# Patient Record
Sex: FEMALE | Race: WHITE | URBAN - METROPOLITAN AREA
[De-identification: names, ages, dates, MRNs, and addresses within clinical notes are randomized per-mention and may not be internally consistent; named-entity substitution may affect disease eponyms.]

---

## 2018-02-05 PROBLEM — Z00.00 GENERAL MEDICAL EXAMINATION: Status: ACTIVE | Noted: 2018-02-05

## 2018-03-05 PROBLEM — Z00.00 ENCOUNTER FOR GENERAL ADULT MEDICAL EXAMINATION WITHOUT ABNORMAL FINDINGS: Status: ACTIVE | Noted: 2018-02-05

## 2020-03-20 PROBLEM — Z48.89 ENCOUNTER FOR POST SURGICAL WOUND CHECK: Status: ACTIVE | Noted: 2020-03-20

## 2024-10-08 ENCOUNTER — TELEPHONE (OUTPATIENT)
Dept: INTERNAL MEDICINE CLINIC | Facility: CLINIC | Age: 74
End: 2024-10-08

## 2024-10-08 ENCOUNTER — OFFICE VISIT (OUTPATIENT)
Dept: INTERNAL MEDICINE CLINIC | Facility: CLINIC | Age: 74
End: 2024-10-08
Payer: MEDICARE

## 2024-10-08 VITALS
DIASTOLIC BLOOD PRESSURE: 78 MMHG | HEIGHT: 62 IN | BODY MASS INDEX: 28.52 KG/M2 | HEART RATE: 75 BPM | WEIGHT: 155 LBS | SYSTOLIC BLOOD PRESSURE: 130 MMHG

## 2024-10-08 DIAGNOSIS — M85.89 OSTEOPENIA OF MULTIPLE SITES: Primary | ICD-10-CM

## 2024-10-08 DIAGNOSIS — S39.012A STRAIN OF LUMBAR REGION, INITIAL ENCOUNTER: Primary | ICD-10-CM

## 2024-10-08 DIAGNOSIS — Z12.31 ENCOUNTER FOR SCREENING MAMMOGRAM FOR BREAST CANCER: ICD-10-CM
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
## 2024-10-08 PROCEDURE — 99204 OFFICE O/P NEW MOD 45 MIN: CPT | Performed by: INTERNAL MEDICINE

## 2024-10-08 RX ORDER — CYCLOBENZAPRINE HCL 5 MG
5 TABLET ORAL NIGHTLY
Qty: 14 TABLET | Refills: 0 | Status: SHIPPED | OUTPATIENT
Start: 2024-10-08
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
## 2024-10-25 ENCOUNTER — OFFICE VISIT (OUTPATIENT)
Dept: PHYSICAL THERAPY | Age: 74
End: 2024-10-25
Attending: INTERNAL MEDICINE
Payer: MEDICARE

## 2024-10-25 DIAGNOSIS — M54.16 LEFT LUMBAR RADICULOPATHY: Primary | ICD-10-CM

## 2024-10-25 PROCEDURE — 97110 THERAPEUTIC EXERCISES: CPT

## 2024-10-25 PROCEDURE — 97162 PT EVAL MOD COMPLEX 30 MIN: CPT

## 2024-10-25 PROCEDURE — 97530 THERAPEUTIC ACTIVITIES: CPT
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
## 2024-11-04 ENCOUNTER — OFFICE VISIT (OUTPATIENT)
Dept: PHYSICAL THERAPY | Age: 74
End: 2024-11-04
Attending: INTERNAL MEDICINE
Payer: MEDICARE

## 2024-11-04 ENCOUNTER — MED REC SCAN ONLY (OUTPATIENT)
Dept: INTERNAL MEDICINE CLINIC | Facility: CLINIC | Age: 74
End: 2024-11-04

## 2024-11-04 DIAGNOSIS — M54.16 LEFT LUMBAR RADICULOPATHY: Primary | ICD-10-CM

## 2024-11-04 PROCEDURE — 97110 THERAPEUTIC EXERCISES: CPT

## 2024-11-04 PROCEDURE — 97112 NEUROMUSCULAR REEDUCATION: CPT

## 2024-11-04 NOTE — PROGRESS NOTES
Diagnosis:   Left lumbar radiculopathy       Referring Provider: Travis Javed  Date of Evaluation:    10/25/24    Precautions:  None Next MD visit:   none scheduled  Date of Surgery: n/a   Insurance Primary/Secondary: MEDICARE / [place]BS [place] INDEMN[place]     # Auth Visits: 10            Subjective: Patient reports no lbp this afternoon. She does feel left le weakness though. She has been doing her HEP and felt ok after her initial eval.    Pain: 0/10      Objective:   Observation/Posture: Forward bent and right trunk lean posture  Neuro Screen: Intact lulu.     Trunk AROM: (* denotes performed with pain)  Flexion: Able to touch her toes  Extension: 25%  Sidebending: R 50%; L 50%  Rotation: R 50%; L 50%     Accessory motion: Lumbar hypomobility in all directions; +2 grades.  Palpation: Left glut. Region.     Strength: (* denotes performed with pain)  LE   Hip flexion (L2): R 4+/5; L 3+/5  Hip abduction: R 4+/5; L 3+/5  Hip Extension: R 4+/5; L 3+/5            Hip ER: R 4+/5; L 3+/5  Hip IR: R 4+/5; L 3+/5  Knee Flexion: R 4+/5; L 3+/5            Knee extension (L3): R 4+/5; L 3+/5            DF (L4): R 4+/5; L 3+/5  Great Toe Ext (L5): R 4+/5, L 3+/5  PF (S1): R 4+/5; L 3+/5      Flexibility:   LE   Hip Flexor: R WFL, L Tight  Hamstrings: R WFL; L Tight  Piriformis: R Tight; L Tighter  Quads: R WFL; L Tight  Gastroc-soleus: R WFL; L Tight      Special tests:   Negative for slump and slr tests lulu.     Gait: pt ambulates on level ground with antalgia, decreased step length right le, decreased stance phase left le, and right trunk lean .      Assessment: Patient presents with left le tightness during her manual stretching exercises. The patient tolerate her table and sitting exercises and did not have any complaints of lbp during her treatment.      Goals: (to be met in 10 visits)   .Pt will improve transversus abdominis recruitment to perform proper isometric contraction without requiring verbal or tactile cuing to  promote advancement of therex   Pt will demonstrate good understanding of proper posture and body mechanics to decrease pain and improve spinal safety   Pt will report improved symptom centralization and absence of radicular symptoms for 3 consecutive days to improve function with ADL   Pt will have decreased paraspinal mm tension to tolerate standing >10 minutes for work and home activities   Pt will demonstrate improved core strength to be able to perform squatting with <1/10 pain   Pt will be independent and compliant with comprehensive HEP to maintain progress achieved in PT     Plan: Plan to work on stretching, strengthening and trom.  Date: 11/4/2024  TX#: 2/10 Date:                 TX#: 3/ Date:                 TX#: 4/ Date:                 TX#: 5/ Date:   Tx#: 6/   Ther. Ex.: 25'  Nu-step 8' L1  Left HS St. With pump 2x30\"  Left PFS and Glut. St. 2x30\"xea.  Trunk Rolls 20xea.  PPT 20x3\"       Neuro Re-ed.- 20'  Supine Marching 20xea.  S/L Clams 30xea.  LAQs 20xea.  Neural Flossing 30x  1/4 Squats 20x       HEP: Reviewed her original HEP.    Charges: 2TE and 1Neuro       Total Timed Treatment: 45 min  Total Treatment Time: 45 min

## 2024-11-06 ENCOUNTER — OFFICE VISIT (OUTPATIENT)
Dept: PHYSICAL THERAPY | Age: 74
End: 2024-11-06
Attending: INTERNAL MEDICINE
Payer: MEDICARE

## 2024-11-06 PROCEDURE — 97112 NEUROMUSCULAR REEDUCATION: CPT

## 2024-11-06 PROCEDURE — 97110 THERAPEUTIC EXERCISES: CPT

## 2024-11-06 NOTE — PROGRESS NOTES
Diagnosis:   Left lumbar radiculopathy       Referring Provider: Travis Javed  Date of Evaluation:    10/25/24    Precautions:  None Next MD visit:   none scheduled  Date of Surgery: n/a   Insurance Primary/Secondary: MEDICARE / [place]DEMN[place]     # Auth Visits: 10            Subjective: Patient reports feeling a lot better. Denies any LBP, but still has  occasional pain down her leg . PT states no strength in (L) LE , has difficulty on stairs and getting up from floor. HEP compliance reported.    Pain: 0/10      Objective:   Observation/Posture: Forward bent and right trunk lean posture  Neuro Screen: Intact lulu.     Trunk AROM: (* denotes performed with pain)  Flexion: Able to touch her toes  Extension: 25%  Sidebending: R 50%; L 50%  Rotation: R 50%; L 50%     Accessory motion: Lumbar hypomobility in all directions; +2 grades.  Palpation: Left glut. Region.     Strength: (* denotes performed with pain)  LE   Hip flexion (L2): R 4+/5; L 3+/5  Hip abduction: R 4+/5; L 3+/5  Hip Extension: R 4+/5; L 3+/5            Hip ER: R 4+/5; L 3+/5  Hip IR: R 4+/5; L 3+/5  Knee Flexion: R 4+/5; L 3+/5            Knee extension (L3): R 4+/5; L 3+/5            DF (L4): R 4+/5; L 3+/5  Great Toe Ext (L5): R 4+/5, L 3+/5  PF (S1): R 4+/5; L 3+/5      Flexibility:   LE   Hip Flexor: R WFL, L Tight  Hamstrings: R WFL; L Tight  Piriformis: R Tight; L Tighter  Quads: R WFL; L Tight  Gastroc-soleus: R WFL; L Tight      Special tests:   Negative for slump and slr tests lulu.     Gait: pt ambulates on level ground with antalgia, decreased step length right le, decreased stance phase left le, and right trunk lean .      Assessment: Patient presents with l min - mod tenderness on (B) SI joint; displays discomfort and pain in LB with bed mobility .  Pt instructed on core activation to prevent worsening of symptoms. Cuing provided with squats  for hip hinging and prevet anterior translation of femur. Encouraged pt to take frequent  strech breaks and to change positions every 20-30 minutes to prevent stiffness and pain. An updated copy of HEP provided to the patient. All exercises performed to fatigue with o exacerbation of symptoms. Planning to progress with core and LE strengthening exercises to resume PLOF.      Goals: (to be met in 10 visits)   .Pt will improve transversus abdominis recruitment to perform proper isometric contraction without requiring verbal or tactile cuing to promote advancement of therex   Pt will demonstrate good understanding of proper posture and body mechanics to decrease pain and improve spinal safety   Pt will report improved symptom centralization and absence of radicular symptoms for 3 consecutive days to improve function with ADL   Pt will have decreased paraspinal mm tension to tolerate standing >10 minutes for work and home activities   Pt will demonstrate improved core strength to be able to perform squatting with <1/10 pain   Pt will be independent and compliant with comprehensive HEP to maintain progress achieved in PT     Plan: Plan to work on stretching, strengthening and trom.  Date: 11/4/2024  TX#: 2/10 Date:11/06/2024              TX#: 3/10 Date:                 TX#: 4/ Date:                 TX#: 5/ Date:   Tx#: 6/   Ther. Ex.: 25'  Nu-step 8' L1  Left HS St. With pump 2x30\"  Left PFS and Glut. St. 2x30\"xea.  Trunk Rolls 20xea.  PPT 20x3\" Ther. Ex.: 25'  -Nu-step 8' L1  -Left HS St. With pump 2x30\"  -Left PFS and Glut. St. 2x30\"xea.  -Trunk Rolls 5 x 1; 10 sec hold   -PPT 20x3\"  - Supine Bridges: 10 x 2  - Supine pelvic tilts: 20 x 1      Neuro Re-ed.- 20'  Supine Marching 20xea.  S/L Clams 30xea.  LAQs 20xea.  Neural Flossing 30x  1/4 Squats 20x Neuro Re-ed.- 20'  -Supine Marching 20 x ea /YTB  -  S/L Clams 10 x 2 ea/ YTB  - Supine Clams: 20 x 1 YTB  -LAQs 20xea.  -Neural Flossing 30x  -1/4 Squats 20x      HEP: Reviewed her original HEP.    Charges: 2TE and 1Neuro       Total Timed Treatment: 45  min  Total Treatment Time: 45 min

## 2024-11-11 ENCOUNTER — OFFICE VISIT (OUTPATIENT)
Dept: PHYSICAL THERAPY | Age: 74
End: 2024-11-11
Attending: INTERNAL MEDICINE
Payer: MEDICARE

## 2024-11-11 PROCEDURE — 97112 NEUROMUSCULAR REEDUCATION: CPT

## 2024-11-11 PROCEDURE — 97110 THERAPEUTIC EXERCISES: CPT

## 2024-11-11 NOTE — PROGRESS NOTES
Diagnosis:   Left lumbar radiculopathy       Referring Provider: Travis Javed  Date of Evaluation:    10/25/24    Precautions:  None Next MD visit:   none scheduled  Date of Surgery: n/a   Insurance Primary/Secondary: MEDICARE / [place]DEMN[place]     # Auth Visits: 10            Subjective: Patient reports being sore yesterday after receiving the covid vaccine. Denies any LBP, but still has minimal tenderness in (L) PSIS area. HEP compliance reported.    Pain: 0/10      Objective:   Observation/Posture: Forward bent and right trunk lean posture  Neuro Screen: Intact lulu.     Trunk AROM: (* denotes performed with pain)  Flexion: Able to touch her toes  Extension: 25%  Sidebending: R 50%; L 50%  Rotation: R 50%; L 50%    Assessment: Patient presents with min - mod tenderness on (L) SI joint; displays discomfort and pain in LB with bed mobility. Progressed hip muscle strengthening with resistance. Initiated 3 way gait with resistance to strengthen hip muscles and  dynamic balance. Displays trunk leaning to (L). Pt able to correct posture with visual and verbal cuing   Pt instructed on core activation to prevent worsening of symptoms. All exercises performed to fatigue with out exacerbation of symptoms. Planning to progress with core and LE strengthening exercises to resume PLOF.      Goals: (to be met in 10 visits)   .Pt will improve transversus abdominis recruitment to perform proper isometric contraction without requiring verbal or tactile cuing to promote advancement of therex   Pt will demonstrate good understanding of proper posture and body mechanics to decrease pain and improve spinal safety   Pt will report improved symptom centralization and absence of radicular symptoms for 3 consecutive days to improve function with ADL   Pt will have decreased paraspinal mm tension to tolerate standing >10 minutes for work and home activities   Pt will demonstrate improved core strength to be able to perform  squatting with <1/10 pain   Pt will be independent and compliant with comprehensive HEP to maintain progress achieved in PT     Plan: Plan to work on stretching, strengthening and trom.  Date: 11/4/2024  TX#: 2/10 Date:11/06/2024              TX#: 3/10 Date:11/11/2024                TX#: 4/0 Date:                 TX#: 5/ Date:   Tx#: 6/   Ther. Ex.: 25'  Nu-step 8' L1  Left HS St. With pump 2x30\"  Left PFS and Glut. St. 2x30\"xea.  Trunk Rolls 20xea.  PPT 20x3\" Ther. Ex.: 25'  -Nu-step 8' L1  -Left HS St. With pump 2x30\"  -Left PFS and Glut. St. 2x30\"xea.  -Trunk Rolls 5 x 1; 10 sec hold   -PPT 20x3\"  - Supine Bridges: 10 x 2  - Supine pelvic tilts: 20 x 1 Ther. Ex.: 25'  -Nu-step 8' L1  -Trunk Rolls 5 x 1; 10 sec hold   -PPT 20x3\"  - Single LE Bridges: 10 x 2  - 3 way stepping : RTB  - figure 4 stretch  - SKTC: 10 x 1   -Seated hip adductor squeeze: 20 x 1; 5 sec  - standing calf muscle stretch: 5x 1; 10 sec    - 3 way ball roll outs; 5x 5 sec     Neuro Re-ed.- 20'  Supine Marching 20xea.  S/L Clams 30xea.  LAQs 20xea.  Neural Flossing 30x  1/4 Squats 20x Neuro Re-ed.- 20'  -Supine Marching 20 x ea /YTB  -  S/L Clams 10 x 2 ea/ YTB  - Supine Clams: 20 x 1 YTB  -LAQs 20xea.  -Neural Flossing 30x  -1/4 Squats 20x Neuro Re-ed.- 18'  -Supine Marching 20 x ea /GTB  -  S/L Clams 20 x 1  GTB  - Supine Clams: 20 x 1 GTB  -1/4 Squats 10 x 2 ; with eccentric control     HEP: Reviewed her original HEP.    Charges: 2TE and 1Neuro       Total Timed Treatment: 45 min  Total Treatment Time: 46 min

## 2024-11-13 ENCOUNTER — OFFICE VISIT (OUTPATIENT)
Dept: PHYSICAL THERAPY | Age: 74
End: 2024-11-13
Attending: INTERNAL MEDICINE
Payer: MEDICARE

## 2024-11-13 PROCEDURE — 97112 NEUROMUSCULAR REEDUCATION: CPT

## 2024-11-13 PROCEDURE — 97110 THERAPEUTIC EXERCISES: CPT

## 2024-11-13 NOTE — PROGRESS NOTES
Diagnosis:   Left lumbar radiculopathy       Referring Provider: Travis Javed  Date of Evaluation:    10/25/24    Precautions:  None Next MD visit:   none scheduled  Date of Surgery: n/a   Insurance Primary/Secondary: MEDICARE / [place] INDEMN[place]     # Auth Visits: 10            Subjective: Patient reports being sore yesterday after receiving the covid vaccine. Denies any LBP, but still has minimal tenderness in (L) PSIS area. HEP compliance reported.    Pain: 0/10      Objective:   Observation/Posture: Forward bent and right trunk lean posture  Neuro Screen: Intact lulu.     Trunk AROM: (* denotes performed with pain)  Flexion: Able to touch her toes  Extension: 25%  Sidebending: R 50%; L 50%  Rotation: R 50%; L 50%    Assessment: Patient presents with progress in her pain , functional mobility and strength .Progressed  supine bridges to SB  to activate core and stabilize spine. Difficulty maintaining balance with bird dog exercises. All exercises performed to fatigue with out exacerbation of symptoms. Planning to progress with core and LE strengthening exercises to resume PLOF.      Goals: (to be met in 10 visits)   .Pt will improve transversus abdominis recruitment to perform proper isometric contraction without requiring verbal or tactile cuing to promote advancement of therex   Pt will demonstrate good understanding of proper posture and body mechanics to decrease pain and improve spinal safety   Pt will report improved symptom centralization and absence of radicular symptoms for 3 consecutive days to improve function with ADL   Pt will have decreased paraspinal mm tension to tolerate standing >10 minutes for work and home activities   Pt will demonstrate improved core strength to be able to perform squatting with <1/10 pain   Pt will be independent and compliant with comprehensive HEP to maintain progress achieved in PT     Plan: Plan to work on stretching, strengthening and trom.  Date: 11/4/2024  TX#:  2/10 Date:11/06/2024              TX#: 3/10 Date:11/11/2024                TX#: 4/0 Date:11/13/2024                TX#: 5/10 Date:   Tx#: 6/   Ther. Ex.: 25'  Nu-step 8' L1  Left HS St. With pump 2x30\"  Left PFS and Glut. St. 2x30\"xea.  Trunk Rolls 20xea.  PPT 20x3\" Ther. Ex.: 25'  -Nu-step 8' L1  -Left HS St. With pump 2x30\"  -Left PFS and Glut. St. 2x30\"xea.  -Trunk Rolls 5 x 1; 10 sec hold   -PPT 20x3\"  - Supine Bridges: 10 x 2  - Supine pelvic tilts: 20 x 1 Ther. Ex.: 25'  -Nu-step 8' L1  -Trunk Rolls 5 x 1; 10 sec hold   -PPT 20x3\"  - Single LE Bridges: 10 x 2  - 3 way stepping : RTB  - figure 4 stretch  - SKTC: 10 x 1   -Seated hip adductor squeeze: 20 x 1; 5 sec  - standing calf muscle stretch: 5x 1; 10 sec    - 3 way ball roll outs; 5x 5 sec Ther. Ex.: 28  -Trunk Rolls 5 x 1; 10 sec hold   -dead bug : 3 sets x 8 reps  DKTC ; 3 x 1; 10 sec   - bird/dog: 3 sets x 8 reps   - chid pose stretch: 3 x 1; 10 s   - DL bridges on SB: 10 x 2; 3 sec   - 3 way stepping :  20 steps x 2  each RTB  - figure 4 stretch: 3 x  15 sec   - standing calf muscle stretch: 3x 1; 15 sec    - 3 way ball roll outs; 3 x 15  sec    Neuro Re-ed.- 20'  Supine Marching 20xea.  S/L Clams 30xea.  LAQs 20xea.  Neural Flossing 30x  1/4 Squats 20x Neuro Re-ed.- 20'  -Supine Marching 20 x ea /YTB  -  S/L Clams 10 x 2 ea/ YTB  - Supine Clams: 20 x 1 YTB  -LAQs 20xea.  -Neural Flossing 30x  -1/4 Squats 20x Neuro Re-ed.- 18'  -Supine Marching 20 x ea /GTB  -  S/L Clams 20 x 1  GTB  - Supine Clams: 20 x 1 GTB  -1/4 Squats 10 x 2 ; with eccentric control Neuro Re-ed.- 18'  -Supine Marching 20 x ea /GTB  -  S/L hip abd with knee bend : 20 x 1 GTB  - SLE standing  3 way hip : 10 x 2  -1/4 Squats 10 x 2 ; with eccentric control    HEP: Reviewed her original HEP.    Charges: 2TE and 1Neuro       Total Timed Treatment: 46 min  Total Treatment Time: 46 min

## 2024-11-18 ENCOUNTER — OFFICE VISIT (OUTPATIENT)
Dept: PHYSICAL THERAPY | Age: 74
End: 2024-11-18
Attending: INTERNAL MEDICINE
Payer: MEDICARE

## 2024-11-18 DIAGNOSIS — M54.16 LEFT LUMBAR RADICULOPATHY: Primary | ICD-10-CM

## 2024-11-18 PROCEDURE — 97110 THERAPEUTIC EXERCISES: CPT

## 2024-11-18 PROCEDURE — 97112 NEUROMUSCULAR REEDUCATION: CPT

## 2024-11-18 NOTE — PROGRESS NOTES
Diagnosis:   Left lumbar radiculopathy       Referring Provider: Travis Javed  Date of Evaluation:    10/25/24    Precautions:  None Next MD visit:   none scheduled  Date of Surgery: n/a   Insurance Primary/Secondary: MEDIC[place] / [place]DEMN[place]     # Auth Visits: 10            Subjective: Patient reports having no pain this am. Patient has been doing her HEP and felt ok after her last visit.    Pain: 0/10      Objective:   Observation/Posture: Forward bent and right trunk lean posture  Neuro Screen: Intact lulu.     Trunk AROM: (* denotes performed with pain)  Flexion: Able to touch her toes  Extension: 25%  Sidebending: R 50%; L 50%  Rotation: R 50%; L 50%    Assessment: Patient presents with no pain during her treatment session. Patient still presents with lulu. Le tightness during her manual stretches. Planning to progress with core and LE strengthening exercises to resume PLOF.      Goals: (to be met in 10 visits)   .Pt will improve transversus abdominis recruitment to perform proper isometric contraction without requiring verbal or tactile cuing to promote advancement of therex   Pt will demonstrate good understanding of proper posture and body mechanics to decrease pain and improve spinal safety   Pt will report improved symptom centralization and absence of radicular symptoms for 3 consecutive days to improve function with ADL   Pt will have decreased paraspinal mm tension to tolerate standing >10 minutes for work and home activities   Pt will demonstrate improved core strength to be able to perform squatting with <1/10 pain   Pt will be independent and compliant with comprehensive HEP to maintain progress achieved in PT     Plan: Plan to work on stretching, strengthening and trom.  Date: 11/4/2024  TX#: 2/10 Date:11/06/2024              TX#: 3/10 Date:11/11/2024                TX#: 4/0 Date:11/13/2024                TX#: 5/10 Date: 11/18/24  Tx#: 6/10   Ther. Ex.: 25'  Nu-step 8' L1  Left HS St. With  pump 2x30\"  Left PFS and Glut. St. 2x30\"xea.  Trunk Rolls 20xea.  PPT 20x3\" Ther. Ex.: 25'  -Nu-step 8' L1  -Left HS St. With pump 2x30\"  -Left PFS and Glut. St. 2x30\"xea.  -Trunk Rolls 5 x 1; 10 sec hold   -PPT 20x3\"  - Supine Bridges: 10 x 2  - Supine pelvic tilts: 20 x 1 Ther. Ex.: 25'  -Nu-step 8' L1  -Trunk Rolls 5 x 1; 10 sec hold   -PPT 20x3\"  - Single LE Bridges: 10 x 2  - 3 way stepping : RTB  - figure 4 stretch  - SKTC: 10 x 1   -Seated hip adductor squeeze: 20 x 1; 5 sec  - standing calf muscle stretch: 5x 1; 10 sec    - 3 way ball roll outs; 5x 5 sec Ther. Ex.: 28  -Trunk Rolls 5 x 1; 10 sec hold   -dead bug : 3 sets x 8 reps  DKTC ; 3 x 1; 10 sec   - bird/dog: 3 sets x 8 reps   - chid pose stretch: 3 x 1; 10 s   - DL bridges on SB: 10 x 2; 3 sec   - 3 way stepping :  20 steps x 2  each RTB  - figure 4 stretch: 3 x  15 sec   - standing calf muscle stretch: 3x 1; 15 sec    - 3 way ball roll outs; 3 x 15  sec Ther. Ex.: 30'  -Trunk Rolls 5 x 1; 10 sec hold   SKTC ; 3 x 1; 10 sec   - DL bridges: 10 x 2; 3 sec   - Side stepping :  5 Laps  - figure 4 stretch: 3 x  15 sec   - standing calf muscle stretch: 3x 1; 15 sec    - 3 way ball roll outs; 3 x 15  sec  Nu-Step 8' L5   Neuro Re-ed.- 20'  Supine Marching 20xea.  S/L Clams 30xea.  LAQs 20xea.  Neural Flossing 30x  1/4 Squats 20x Neuro Re-ed.- 20'  -Supine Marching 20 x ea /YTB  -  S/L Clams 10 x 2 ea/ YTB  - Supine Clams: 20 x 1 YTB  -LAQs 20xea.  -Neural Flossing 30x  -1/4 Squats 20x Neuro Re-ed.- 18'  -Supine Marching 20 x ea /GTB  -  S/L Clams 20 x 1  GTB  - Supine Clams: 20 x 1 GTB  -1/4 Squats 10 x 2 ; with eccentric control Neuro Re-ed.- 18'  -Supine Marching 20 x ea /GTB  -  S/L hip abd with knee bend : 20 x 1 GTB  - SLE standing  3 way hip : 10 x 2  -1/4 Squats 10 x 2 ; with eccentric control Neuro Re-ed.- 15'  -Supine Marching 20 x ea.  - SLE standing  2 way hip : 10 x 2ea.  -1/4 Squats 10 x 2 ; with eccentric control   HEP: Did not add any new  exercises to her HEP.    Charges: 2TE and 1Neuro       Total Timed Treatment: 45 min  Total Treatment Time: 45 min

## 2024-11-21 ENCOUNTER — APPOINTMENT (OUTPATIENT)
Dept: PHYSICAL THERAPY | Age: 74
End: 2024-11-21
Attending: INTERNAL MEDICINE
Payer: MEDICARE

## 2024-11-22 ENCOUNTER — OFFICE VISIT (OUTPATIENT)
Dept: PHYSICAL THERAPY | Age: 74
End: 2024-11-22
Attending: INTERNAL MEDICINE
Payer: MEDICARE

## 2024-11-22 PROCEDURE — 97110 THERAPEUTIC EXERCISES: CPT

## 2024-11-22 PROCEDURE — 97112 NEUROMUSCULAR REEDUCATION: CPT

## 2024-11-22 NOTE — PROGRESS NOTES
Diagnosis:   Left lumbar radiculopathy       Referring Provider: Travis Javed  Date of Evaluation:    10/25/24    Precautions:  None Next MD visit:   none scheduled  Date of Surgery: n/a   Insurance Primary/Secondary: MEDICARE / [place] INDEMNITY     # Auth Visits: 10          Discharge Summary  Pt has attended 6 visits in Physical Therapy.        Subjective: Patient  denies any pain today and states feeling a lot better. reports  50% overall improvement , states that she is feeling a lot better and want to get discharged from therapy. She cancelled her future PT appointments. HEP compliance reported.    Pain: 0/10      Objective:   Observation/Posture: Forward bent and right trunk lean posture  Neuro Screen: Intact lulu.     Trunk AROM: (* denotes performed with pain)  Flexion: Able to touch her toes  Extension: WFL  Sidebending: WFL  Rotation: WFL    Assessment: Patient  displayss significantprogress in her functional strength ,mobility and pain .She has met all her established goals. Pt is being discherged from skilled therapy today. Reviwed HEP and provided pt an updated copy of HEP for continued wellness. Pt verbalized understanding .      Goals: (to be met in 10 visits)   .Pt will improve transversus abdominis recruitment to perform proper isometric contraction without requiring verbal or tactile cuing to promote advancement of therex - Met  Pt will demonstrate good understanding of proper posture and body mechanics to decrease pain and improve spinal safety - Met  Pt will report improved symptom centralization and absence of radicular symptoms for 3 consecutive days to improve function with ADL - Met  Pt will have decreased paraspinal mm tension to tolerate standing >10 minutes for work and home activities - Met  Pt will demonstrate improved core strength to be able to perform squatting with <1/10 pain - Met  Pt will be independent and compliant with comprehensive HEP to maintain progress achieved in PT  - Met    Plan:Discharge from skilled PT . Reviewed HEP with the patient for continued wellness  Post Oswestry Disability Index Score  No data recorded  12 % improvement    Plan: Discharge skilled therapy     Patient/Family/Caregiver was advised of these findings, precautions, and treatment options and has agreed to actively participate in planning and for this course of care.    Thank you for your referral. If you have any questions, please contact me at Dept: [phone].    Sincerely,  Electronically signed by therapist: Abigail Laguerre PT     Physician's certification required:  Yes  Please co-sign or sign and return this letter via fax as soon as possible to [phone].   I certify the need for these services furnished under this plan of treatment and while under my care.    X___________________________________________________ Date____________________                    Certification From: 11/22/2024  To:2/20/2025   Date: 11/4/2024  TX#: 2/10 Date:11/06/2024              TX#: 3/10 Date:11/11/2024                TX#: 4/0 Date:11/13/2024                TX#: 5/10 Date:11/22/2024   Tx#: 6/10   Ther. Ex.: 25'  Nu-step 8' L1  Left HS St. With pump 2x30\"  Left PFS and Glut. St. 2x30\"xea.  Trunk Rolls 20xea.  PPT 20x3\" Ther. Ex.: 25'  -Nu-step 8' L1  -Left HS St. With pump 2x30\"  -Left PFS and Glut. St. 2x30\"xea.  -Trunk Rolls 5 x 1; 10 sec hold   -PPT 20x3\"  - Supine Bridges: 10 x 2  - Supine pelvic tilts: 20 x 1 Ther. Ex.: 25'  -Nu-step 8' L1  -Trunk Rolls 5 x 1; 10 sec hold   -PPT 20x3\"  - Single LE Bridges: 10 x 2  - 3 way stepping : RTB  - figure 4 stretch  - SKTC: 10 x 1   -Seated hip adductor squeeze: 20 x 1; 5 sec  - standing calf muscle stretch: 5x 1; 10 sec    - 3 way ball roll outs; 5x 5 sec Ther. Ex.: 28  -Trunk Rolls 5 x 1; 10 sec hold   -dead bug : 3 sets x 8 reps  DKTC ; 3 x 1; 10 sec   - bird/dog: 3 sets x 8 reps   - chid pose stretch: 3 x 1; 10 s   - DL bridges on SB: 10 x 2; 3 sec   - 3 way stepping  :  20 steps x 2  each RTB  - figure 4 stretch: 3 x  15 sec   - standing calf muscle stretch: 3x 1; 15 sec    - 3 way ball roll outs; 3 x 15  sec Ther. Ex.: 28  - Nustep: 8 minutes  - Mini squats: 10 x 2  -Trunk Rolls 5 x 1; 10 sec hold   -dead bug : 3 sets x 8 reps  DKTC ; 3 x 1; 10 sec   - bird/dog: 3 sets x 8 reps   - chid pose stretch: 3 x 1; 10 s   - DL bridges/ball roll ins  on SB: 8 x 2 each   - 3 way stepping :  20 steps x 2  each RTB  - figure 4 stretch: 3 x  15 sec   - standing calf muscle stretch: 3x 1; 15 sec    - 3 way ball roll outs; 3 x 15  sec   Neuro Re-ed.- 20'  Supine Marching 20xea.  S/L Clams 30xea.  LAQs 20xea.  Neural Flossing 30x  1/4 Squats 20x Neuro Re-ed.- 20'  -Supine Marching 20 x ea /YTB  -  S/L Clams 10 x 2 ea/ YTB  - Supine Clams: 20 x 1 YTB  -LAQs 20xea.  -Neural Flossing 30x  -1/4 Squats 20x Neuro Re-ed.- 18'  -Supine Marching 20 x ea /GTB  -  S/L Clams 20 x 1  GTB  - Supine Clams: 20 x 1 GTB  -1/4 Squats 10 x 2 ; with eccentric control Neuro Re-ed.- 18'  -Supine Marching 20 x ea /GTB  -  S/L hip abd with knee bend : 20 x 1 GTB  - SLE standing  3 way hip : 10 x 2  -1/4 Squats 10 x 2 ; with eccentric control Neuro Re-ed.- 18'  -Supine Marching 20 x ea /GTB  -  S/L hip abd with knee bend : 20 x 1 GTB  - SLE standing  3 way hip : 10 x 2  -1/4 Squats 10 x 2 ; with eccentric contr   HEP: Reviewed her original HEP.    Charges: 2TE and 1Neuro       Total Timed Treatment: 46 min  Total Treatment Time: 46 min

## 2024-11-25 ENCOUNTER — APPOINTMENT (OUTPATIENT)
Dept: PHYSICAL THERAPY | Age: 74
End: 2024-11-25
Attending: INTERNAL MEDICINE
Payer: MEDICARE

## 2025-01-28 ENCOUNTER — TELEPHONE (OUTPATIENT)
Dept: INTERNAL MEDICINE CLINIC | Facility: CLINIC | Age: 75
End: 2025-01-28

## 2025-01-28 DIAGNOSIS — Z12.31 VISIT FOR SCREENING MAMMOGRAM: Primary | ICD-10-CM

## 2025-01-28 NOTE — TELEPHONE ENCOUNTER
Patient called to request Order for Mammogram so she can go to [facility] instead of internal.     [facility]

## 2025-01-30 NOTE — TELEPHONE ENCOUNTER
I have ordered the mammo, but she is known to me from my previous job. She is following with Dr. Lawson now it seems? If she wants to follow with me, she should come in to establish care

## 2025-01-30 NOTE — TELEPHONE ENCOUNTER
Spoke to Pt, relayed Dr message  , stated she will be following up with Dr Javed, she wasn't sure why encounter was sent to you

## 2025-03-04 ENCOUNTER — OFFICE VISIT (OUTPATIENT)
Dept: INTERNAL MEDICINE CLINIC | Facility: CLINIC | Age: 75
End: 2025-03-04

## 2025-03-04 VITALS
DIASTOLIC BLOOD PRESSURE: 70 MMHG | WEIGHT: 166.19 LBS | SYSTOLIC BLOOD PRESSURE: 128 MMHG | HEIGHT: 62 IN | BODY MASS INDEX: 30.58 KG/M2 | HEART RATE: 79 BPM

## 2025-03-04 DIAGNOSIS — E78.2 MIXED HYPERLIPIDEMIA: ICD-10-CM

## 2025-03-04 DIAGNOSIS — Z12.11 COLON CANCER SCREENING: ICD-10-CM

## 2025-03-04 DIAGNOSIS — Z87.19 HISTORY OF DIVERTICULITIS OF COLON: ICD-10-CM

## 2025-03-04 DIAGNOSIS — E55.9 VITAMIN D DEFICIENCY: ICD-10-CM

## 2025-03-04 DIAGNOSIS — M85.89 OSTEOPENIA OF MULTIPLE SITES: ICD-10-CM

## 2025-03-04 DIAGNOSIS — Z00.00 MEDICARE ANNUAL WELLNESS VISIT, SUBSEQUENT: Primary | ICD-10-CM

## 2025-03-04 DIAGNOSIS — Z12.31 ENCOUNTER FOR SCREENING MAMMOGRAM FOR BREAST CANCER: ICD-10-CM

## 2025-03-04 PROCEDURE — G0439 PPPS, SUBSEQ VISIT: HCPCS | Performed by: INTERNAL MEDICINE

## 2025-03-04 NOTE — PROGRESS NOTES
Subjective:   Stacia Falcon is a 75 year old female who presents for a Medicare Subsequent Annual Wellness visit (Pt already had Initial Annual Wellness) and scheduled follow up of multiple significant but stable problems.       History/Other:   Fall Risk Assessment:   She has been screened for Falls and is low risk.      Cognitive Assessment:   She had a completely normal cognitive assessment - see flowsheet entries     Functional Ability/Status:   Stacia Falcon has some abnormal functions as listed below:  She has Vision problems based on screening of functional status.       Depression Screening (PHQ):  PHQ-2 SCORE: 0  , done 3/4/2025            Advanced Directives:   She does have a Living Will but we do NOT have it on file in Epic.    She does have a POA but we do NOT have it on file in [email or web address].    Patient has Advance Care Planning documents present in EMR. Reviewed documents with patient (and family/surrogate if present).      Patient Active Problem List   Diagnosis    Hypercholesterolemia    Spinal stenosis in cervical region    Microscopic hematuria    Vitamin D deficiency    Encounter for screening mammogram for malignant neoplasm of breast    Encounter for screening for osteoporosis    Encounter for general adult medical examination without abnormal findings    Disorder of bone, unspecified    Encounter for screening for malignant neoplasm of cervix    Colon cancer screening    Osteopenia of multiple sites    Constipation, slow transit    Menopausal and postmenopausal disorder    Diverticulosis of large intestine without hemorrhage    Mixed hyperlipidemia     Allergies:  She is allergic to codeine.    Current Medications:  Outpatient Medications Marked as Taking for the 3/4/25 encounter (Office Visit) with Travis Javed MD   Medication Sig    Lovastatin 20 MG Oral Tab Take 1 tablet (20 mg total) by mouth nightly.    Cholecalciferol (VITAMIN D-3) 5000 units Oral Tab Take 2,500 tablets (12,500,000  Units total) by mouth daily.       Medical History:  She  has a past medical history of Constipation, slow transit, Disorder of bone, unspecified, Diverticulitis of large intestine with perforation and abscess with bleeding, Diverticulosis of large intestine without hemorrhage, Encounter for general adult medical examination without abnormal findings, Encounter for screening for malignant neoplasm of cervix, Encounter for screening for malignant neoplasm of intestinal tract, unspecified, Encounter for screening for osteoporosis, Encounter for screening mammogram for malignant neoplasm of breast, GBS (Guillain Tyrone syndrome) (HCC), Hypercholesterolemia, Hyperlipidemia, Microscopic hematuria, Osteopenia, unspecified location, Spinal stenosis in cervical region, and Vitamin D deficiency, unspecified.  Surgical History:  She  has a past surgical history that includes tonsillectomy; other surgical history (Right); other surgical history (2020); and other surgical history (2020).   Family History:  Her family history includes Heart Disease in her brother, father, and mother; Heart Disorder in her brother, father, and mother; No Known Problems in her daughter and son; peripheral vascular disease in her mother.  Social History:  She  reports that she has never smoked. She has never used smokeless tobacco. She reports current alcohol use. She reports that she does not use drugs.    Tobacco:  She has never smoked tobacco.    CAGE Alcohol Screen:   CAGE screening score of 0 on 3/4/2025, showing low risk of alcohol abuse.      Patient Care Team:  Fadi Braxton MD as PCP - General (Internal Medicine)  Claudette Land MD (OBSTETRICS & GYNECOLOGY)  Qamar Agustin (GASTROENTEROLOGY)  Nenita Hernandez MD (UROLOGY)    Review of Systems  GENERAL: feels well otherwise  SKIN: denies any unusual skin lesions  EYES: denies blurred vision or double vision  HEENT: denies nasal congestion, sinus pain or ST  LUNGS: denies shortness of  breath with exertion  CARDIOVASCULAR: denies chest pain on exertion  GI: denies abdominal pain, denies heartburn; no hematochezia/hematemesis   : denies dysuria, vaginal discharge or itching, no complaint of urinary incontinence; no hematuria  MUSCULOSKELETAL: denies back pain  NEURO: denies headaches  PSYCHE: denies depression or anxiety  HEMATOLOGIC: denies hx of anemia  ENDOCRINE: denies thyroid history  ALL/ASTHMA: denies hx of allergy or asthma    Objective:   Physical Exam  Constitutional:       General: She is not in acute distress.      General Appearance:  Alert, cooperative, no distress, appears stated age   Head:  Normocephalic, without obvious abnormality, atraumatic   Eyes:  PERRL, conjunctiva/corneas clear, EOM's intact both eyes   Ears:  Normal TM's and external ear canals, both ears   Nose: Nares normal, septum midline,mucosa normal, no drainage or sinus tenderness   Throat: Lips, mucosa, and tongue normal; teeth and gums normal   Neck: Supple, symmetrical, trachea midline, no adenopathy;  thyroid: not enlarged, symmetric, no tenderness/mass/nodules; no carotid bruit or JVD   Back:   Symmetric, no curvature, ROM normal, no CVA tenderness   Lungs:   Clear to auscultation bilaterally, respirations unlabored   Heart:  Regular rate and rhythm, S1 and S2 normal, no murmur, rub, or gallop   Abdomen:   Soft, non-tender, bowel sounds active all four quadrants,  no masses, no organomegaly   Pelvic: Deferred   Extremities: Extremities normal, atraumatic, no cyanosis or edema   Pulses: 2+ and symmetric   Skin: Skin color, texture, turgor normal, no rashes or lesions   Lymph nodes: Cervical, supraclavicular,  nodes normal   Neurologic: Normal       /70   Pulse 79   Ht 5' 2\" (1.575 m)   Wt 166 lb 3 oz (75.4 kg)   BMI 30.40 kg/m²  Estimated body mass index is 30.4 kg/m² as calculated from the following:    Height as of this encounter: 5' 2\" (1.575 m).    Weight as of this encounter: 166 lb 3 oz (75.4  kg).    Medicare Hearing Assessment:   Hearing Screening    Screening Method: Finger Rub  Finger Rub Result: Pass         Visual Acuity:   Right Eye Visual Acuity: Uncorrected Right Eye Chart Acuity: 20/15   Left Eye Visual Acuity: Uncorrected Left Eye Chart Acuity: 20/70   Both Eyes Visual Acuity: Uncorrected Both Eyes Chart Acuity: 20/13   Able To Tolerate Visual Acuity: Yes        Assessment & Plan:   Stacia Falcon is a 75 year old female who presents for a Medicare Assessment.     (Z00.00) Medicare annual wellness visit, subsequent  (primary encounter diagnosis)  Plan: CBC With Differential With Platelet, Comp         Metabolic Panel (14), Hemoglobin A1C, Lipid         Panel, TSH W Reflex To Free T4, Vitamin D,         Urinalysis, Routine        Routine labs have been ordered.  The patient is not able to get the flu shot given her history of given bradycardia in the past.    (E78.2) Mixed hyperlipidemia  Plan: Will check a lipid panel.  The patient has been on lovastatin.  Continue to follow low-fat low-cholesterol diet.    (M85.89) Osteopenia of multiple sites  Plan: Patient ready scheduled a DEXA scan.  Continue to take calcium vitamin D supplement.    (E55.9) Vitamin D deficiency  Plan: Vitamin D        Will check her vitamin D.  Continue vitamin D supplement.    (Z12.31) Encounter for screening mammogram for breast cancer  Plan: The patient ready scheduled appointment for her mammogram.    (Z12.11) Colon cancer screening  Plan: Gastro Referral - In Network        The patient's last colonoscopy was about 5 years ago however the patient having had an episode of severe diverticulitis before even had to have a colon resection.  I told the patient that we should at least get a GI consultation for their opinion whether the patient still has to go for colonoscopy given that she has had this episodes of diverticulitis.  I did gave her referral to see one of our gastroenterologist.    (Z87.19) History of  diverticulitis of colon  Plan: Perforated diverticulitis before and had colon resection done in the past.     The patient indicates understanding of these issues and agrees to the plan.  Reinforced healthy diet, lifestyle, and exercise.      No follow-ups on file.     Travis Javed MD, 3/4/2025     Supplementary Documentation:   General Health:  In the past six months, have you lost more than 10 pounds without trying?: 2 - No  Has your appetite been poor?: No  Type of Diet: Balanced  How does the patient maintain a good energy level?:  (works)  How would you describe your daily physical activity?: Light  How would you describe your current health state?: Good  How do you maintain positive mental well-being?: Social Interaction, Visiting Friends, Visiting Family  On a scale of 0 to 10, with 0 being no pain and 10 being severe pain, what is your pain level?: 0 - (None)  In the past six months, have you experienced urine leakage?: 1-Yes  At any time do you feel concerned for the safety/well-being of yourself and/or your children, in your home or elsewhere?: No  Have you had any immunizations at another office such as Influenza, Hepatitis B, Tetanus, or Pneumococcal?: Yes    Health Maintenance   Topic Date Due    Pneumococcal Vaccine: 50+ Years (1 of 1 - PCV) Never done    COVID-19 Vaccine (8 - 2024-25 season) 05/09/2025    Annual Physical  03/04/2026    Colorectal Cancer Screening  11/01/2027    DEXA Scan  Completed    Annual Depression Screening  Completed    Fall Risk Screening (Annual)  Completed    Zoster Vaccines  Completed    Meningococcal B Vaccine  Aged Out    Mammogram  Discontinued

## 2025-03-10 ENCOUNTER — MED REC SCAN ONLY (OUTPATIENT)
Dept: INTERNAL MEDICINE CLINIC | Facility: CLINIC | Age: 75
End: 2025-03-10

## 2025-03-19 ENCOUNTER — HOSPITAL ENCOUNTER (OUTPATIENT)
Dept: BONE DENSITY | Age: 75
Discharge: HOME | End: 2025-03-19
Attending: INTERNAL MEDICINE
Payer: MEDICARE

## 2025-03-19 DIAGNOSIS — M85.89 OSTEOPENIA OF MULTIPLE SITES: ICD-10-CM

## 2025-03-19 PROCEDURE — 77080 DXA BONE DENSITY AXIAL: CPT | Performed by: INTERNAL MEDICINE

## 2025-04-01 ENCOUNTER — LAB ENCOUNTER (OUTPATIENT)
Dept: LAB | Age: 75
End: 2025-04-01
Attending: INTERNAL MEDICINE
Payer: MEDICARE

## 2025-04-01 DIAGNOSIS — E55.9 VITAMIN D DEFICIENCY: ICD-10-CM

## 2025-04-01 DIAGNOSIS — Z00.00 MEDICARE ANNUAL WELLNESS VISIT, SUBSEQUENT: ICD-10-CM

## 2025-04-01 LAB
ALBUMIN SERPL-MCNC: 4.7 G/DL (ref 3.2–4.8)
ALBUMIN/GLOB SERPL: 1.9 {RATIO} (ref 1–2)
ALP LIVER SERPL-CCNC: 38 U/L
ALT SERPL-CCNC: 19 U/L
ANION GAP SERPL CALC-SCNC: 10 MMOL/L (ref 0–18)
AST SERPL-CCNC: 19 U/L (ref ?–34)
BASOPHILS # BLD AUTO: 0.07 X10(3) UL (ref 0–0.2)
BASOPHILS NFR BLD AUTO: 0.7 %
BILIRUB SERPL-MCNC: 0.4 MG/DL (ref 0.2–1.1)
BILIRUB UR QL: NEGATIVE
BUN BLD-MCNC: 14 MG/DL (ref 9–23)
BUN/CREAT SERPL: 17.3 (ref 10–20)
CALCIUM BLD-MCNC: 9.9 MG/DL (ref 8.7–10.4)
CHLORIDE SERPL-SCNC: 102 MMOL/L (ref 98–112)
CHOLEST SERPL-MCNC: 201 MG/DL (ref ?–200)
CLARITY UR: CLEAR
CO2 SERPL-SCNC: 29 MMOL/L (ref 21–32)
CREAT BLD-MCNC: 0.81 MG/DL
DEPRECATED RDW RBC AUTO: 40.5 FL (ref 35.1–46.3)
EGFRCR SERPLBLD CKD-EPI 2021: 76 ML/MIN/1.73M2 (ref 60–?)
EOSINOPHIL # BLD AUTO: 0.21 X10(3) UL (ref 0–0.7)
EOSINOPHIL NFR BLD AUTO: 2.1 %
ERYTHROCYTE [DISTWIDTH] IN BLOOD BY AUTOMATED COUNT: 12.5 % (ref 11–15)
EST. AVERAGE GLUCOSE BLD GHB EST-MCNC: 120 MG/DL (ref 68–126)
FASTING PATIENT LIPID ANSWER: YES
FASTING STATUS PATIENT QL REPORTED: YES
GLOBULIN PLAS-MCNC: 2.5 G/DL (ref 2–3.5)
GLUCOSE BLD-MCNC: 87 MG/DL (ref 70–99)
GLUCOSE UR-MCNC: NORMAL MG/DL
HBA1C MFR BLD: 5.8 % (ref ?–5.7)
HCT VFR BLD AUTO: 46.6 %
HDLC SERPL-MCNC: 61 MG/DL (ref 40–59)
HGB BLD-MCNC: 16.1 G/DL
IMM GRANULOCYTES # BLD AUTO: 0.03 X10(3) UL (ref 0–1)
IMM GRANULOCYTES NFR BLD: 0.3 %
KETONES UR-MCNC: NEGATIVE MG/DL
LDLC SERPL CALC-MCNC: 102 MG/DL (ref ?–100)
LEUKOCYTE ESTERASE UR QL STRIP.AUTO: 75
LYMPHOCYTES # BLD AUTO: 2.31 X10(3) UL (ref 1–4)
LYMPHOCYTES NFR BLD AUTO: 23.1 %
MCH RBC QN AUTO: 30.5 PG (ref 26–34)
MCHC RBC AUTO-ENTMCNC: 34.5 G/DL (ref 31–37)
MCV RBC AUTO: 88.3 FL
MONOCYTES # BLD AUTO: 0.52 X10(3) UL (ref 0.1–1)
MONOCYTES NFR BLD AUTO: 5.2 %
NEUTROPHILS # BLD AUTO: 6.84 X10 (3) UL (ref 1.5–7.7)
NEUTROPHILS # BLD AUTO: 6.84 X10(3) UL (ref 1.5–7.7)
NEUTROPHILS NFR BLD AUTO: 68.6 %
NITRITE UR QL STRIP.AUTO: NEGATIVE
NONHDLC SERPL-MCNC: 140 MG/DL (ref ?–130)
OSMOLALITY SERPL CALC.SUM OF ELEC: 292 MOSM/KG (ref 275–295)
PH UR: 5 [PH] (ref 5–8)
PLATELET # BLD AUTO: 293 10(3)UL (ref 150–450)
POTASSIUM SERPL-SCNC: 4.4 MMOL/L (ref 3.5–5.1)
PROT SERPL-MCNC: 7.2 G/DL (ref 5.7–8.2)
PROT UR-MCNC: NEGATIVE MG/DL
RBC # BLD AUTO: 5.28 X10(6)UL
SODIUM SERPL-SCNC: 141 MMOL/L (ref 136–145)
SP GR UR STRIP: 1.02 (ref 1–1.03)
TRIGL SERPL-MCNC: 226 MG/DL (ref 30–149)
TSI SER-ACNC: 3.17 UIU/ML (ref 0.55–4.78)
UROBILINOGEN UR STRIP-ACNC: NORMAL
VIT D+METAB SERPL-MCNC: 53.9 NG/ML (ref 30–100)
VLDLC SERPL CALC-MCNC: 38 MG/DL (ref 0–30)
WBC # BLD AUTO: 10 X10(3) UL (ref 4–11)

## 2025-04-01 PROCEDURE — 82306 VITAMIN D 25 HYDROXY: CPT

## 2025-04-01 PROCEDURE — 81001 URINALYSIS AUTO W/SCOPE: CPT

## 2025-04-01 PROCEDURE — 84443 ASSAY THYROID STIM HORMONE: CPT

## 2025-04-01 PROCEDURE — 36415 COLL VENOUS BLD VENIPUNCTURE: CPT

## 2025-04-01 PROCEDURE — 83036 HEMOGLOBIN GLYCOSYLATED A1C: CPT

## 2025-04-01 PROCEDURE — 80061 LIPID PANEL: CPT

## 2025-04-01 PROCEDURE — 85025 COMPLETE CBC W/AUTO DIFF WBC: CPT

## 2025-04-01 PROCEDURE — 80053 COMPREHEN METABOLIC PANEL: CPT

## 2025-04-04 ENCOUNTER — PATIENT MESSAGE (OUTPATIENT)
Dept: INTERNAL MEDICINE CLINIC | Facility: CLINIC | Age: 75
End: 2025-04-04

## 2025-04-06 ENCOUNTER — TELEPHONE (OUTPATIENT)
Dept: INTERNAL MEDICINE CLINIC | Facility: CLINIC | Age: 75
End: 2025-04-06

## 2025-04-06 DIAGNOSIS — E78.2 MIXED HYPERLIPIDEMIA: Primary | ICD-10-CM

## 2025-04-06 RX ORDER — LOVASTATIN 40 MG/1
40 TABLET ORAL NIGHTLY
Qty: 90 TABLET | Refills: 0 | Status: SHIPPED | OUTPATIENT
Start: 2025-04-06

## 2025-04-08 RX ORDER — LOVASTATIN 20 MG/1
20 TABLET ORAL NIGHTLY
Qty: 90 TABLET | Refills: 3
Start: 2025-04-08

## 2025-04-08 NOTE — TELEPHONE ENCOUNTER
Dose increased to Lovastatin 40 mg on 04/06/2025    Please see Lab notes from 04/06/2025  Your A1c was 5.8 so in prediabetic range; watch carbohydrates in your diet like bread, pasta, and sweets .  Your total cholesterol and LdL bad cholesterol mildly elevated inspite of taking lovastatin 20mg daily so would recommend increasing to 40mg daily instead and then repeating cholesterol panel in 3mos. I will send script to pharmacy  Your cbc blood count, chemisry panel,  tsh thyroid test, urinalysis  and vit D were good.

## 2025-06-04 NOTE — H&P
[facility] - Gastroenterology                                                                                                  Clinic History and Physical       Referring provider: Tegan    Chief Complaint   Patient presents with    Consult     Hx of colostomy about 5 years ago; colon was about 18 months prior to that showed diverticulitis; establishing care with new GI per PCP     HPI:   Stacia Falcon is a 75 year old woman with history of BMI 30, hypercholesterolemia, vitamin D deficiency, osteopenia, cervical spinal stenosis, GBS, tonsillectomy, complicated diverticulitis in 2020 s/p colostomy with reversal here regarding colonoscopy    Says she is here to discuss whether or not to have colonoscopy.  Notes having had a colonoscopy a few years prior to when she ended up with complicated diverticulitis requiring surgery in 2020.  Says she went and having bad abdominal pain found to have diverticulitis again requiring surgery which was a colostomy eventually with reversal.  Has never had diverticulitis prior to that or since.  At this time she denies any specific gastrointestinal issues or symptoms aside from perhaps occasional altered bowel habits which has been the case since reversal of her colostomy but this has been a regular irregular bowel habit and has not changed over the course of the last 5 years.  No specific family history of colon cancer    History, Medications, Allergies, ROS:      Past Medical History[1]   Past Surgical History[2]   Family Hx: Family History[3]   Social History: Short Social Hx on File[4]     Medications (Active prior to today's visit):  Current Medications[5]    Allergies:  Allergies[6]    ROS:   Systems were reviewed and were negative except as noted in the HPI    PHYSICAL EXAM:   Blood pressure 120/70, height 5' 2\" (1.575 m), weight 164 lb (74.4 kg).    General:awake, cooperative, no  acute distress  HEENT: EOMI, no scleral icterus, MMM; oral pharnyx is without exudates or lesions  Neck: no lymphadenopathy; thyroid is not enlarged and without nodules  CV: RRR  Resp: non-labored breathing  Abd: soft, non-tender, non-distended  Ext: no lower extremity swelling  Neuro: Alert, Oriented X 3  Skin: no rashes, bruises  Psych: normal affect    Labs/Imaging:     Reviewed as noted in the HPI and A/P    ASSESSMENT/PLAN:   Stacia Falcon is a 75 year old woman with history of BMI 30, hypercholesterolemia, vitamin D deficiency, osteopenia, cervical spinal stenosis, GBS, tonsillectomy, complicated diverticulitis in 2020 s/p colostomy with reversal here regarding colonoscopy    We had a very lengthy discussion regarding colonoscopy.  We reviewed the procedure report from her last colonoscopy in November 2017 which indicates a good preparation without any growth and recommendations to repeat in 10 years.  We discussed at this time she kaur not appear to have any symptoms that would require necessitated diagnostic colonoscopy.  We also discussed the guidelines that typically would recommend updating her colonoscopy if she has not had one within the year of an event of diverticulitis to exclude colon cancer.  However we discussed that as this event was 5 years ago would be unlikely that there would specifically have been a cancer that was missed then and she remains asymptomatic from.  We did discuss however colonoscopy certainly could be planned for at this time understanding the benefits of excluding colon cancer and identifying precancerous type growth as well as the risks including but not limited to bleeding, infections, perforation requiring hospitalization or surgery.  She does express never wanting to experience a colostomy again and the fear of a perforation/complication requiring this.  At this time she prefers to defer any colonoscopy which is reasonable.  We discussed consideration of rediscussing at the  time of 10 years from last colonoscopy which would be November 2027.  We discussed at that time we can discuss again the benefits and risks and her preferences of colonoscopy for colorectal cancer screening which again could include colonoscopy as well as noninvasive testing like stool testing which we did go through in detail today as well.  We did discuss worrisome signs and symptoms that should prompt her to reach out and return for potential evaluation including colonoscopy.  Lastly we did discuss healthy lifestyle measures for diverticulitis.    I spent 45 minutes obtaining history, evaluating the patient including a medically appropriate exam, discussing treatment options and counseling and educating the patient, reviewing the patient's chart, ordering tests/medications/procedures, and completing documentation    Orders This Visit:  No orders of the defined types were placed in this encounter.    Meds This Visit:  Requested Prescriptions      No prescriptions requested or ordered in this encounter     Imaging & Referrals:  None     Thony Henderson MD  [facility] - Gastroenterology  6/5/2025             [1]   Past Medical History:   Constipation, slow transit    Disorder of bone, unspecified    Diverticulitis of large intestine with perforation and abscess with bleeding    Dr. Benitez    Diverticulosis of large intestine without hemorrhage    Encounter for general adult medical examination without abnormal findings    Encounter for screening for malignant neoplasm of cervix    Encounter for screening for malignant neoplasm of intestinal tract, unspecified    Dr. Trujillo 2017 normal, next 2027    Encounter for screening for osteoporosis    Encounter for screening mammogram for malignant neoplasm of breast    GBS (Guillain [place] syndrome) (HCC)    Resolved, no flu vaccines, 2001    Hypercholesterolemia    Hyperlipidemia    Microscopic hematuria    Osteopenia, unspecified location    DEXA scan 2010  osteopenia, 2017, osteopenia 2021    Spinal stenosis in cervical region    C4C6    Vitamin D deficiency, unspecified   [2]   Past Surgical History:  Procedure Laterality Date    Other surgical history Right     thumb    Other surgical history  2020    Diverticulitis/abscess perforation partial colectomy/colostomy Dr. Benitez    Other surgical history  2020    Reversal of colostomy    Tonsillectomy     [3]   Family History  Problem Relation Age of Onset    Heart Disorder Father     Heart Disease Father         MI 6 6 y/o    Heart Disorder Mother         CAD    Heart Disease Mother     Other (peripheral vascular disease) Mother     No Known Problems Daughter     No Known Problems Son     Heart Disorder Brother         CAD 64 y/o    Heart Disease Brother    [4]   Social History  Socioeconomic History    Marital status:      Spouse name: Anthony    Number of children: 2   Occupational History    Occupation: Real estate   Tobacco Use    Smoking status: Never    Smokeless tobacco: Never   Vaping Use    Vaping status: Never Used   Substance and Sexual Activity    Alcohol use: Yes     Comment: socially    Drug use: No   Other Topics Concern    Special Diet No    Exercise No   [5]   Current Outpatient Medications   Medication Sig Dispense Refill    Lovastatin 40 MG Oral Tab Take 1 tablet (40 mg total) by mouth nightly. 90 tablet 0    Cholecalciferol (VITAMIN D-3) 5000 units Oral Tab Take 2,500 tablets (12,500,000 Units total) by mouth daily.     [6]   Allergies  Allergen Reactions    Codeine UNKNOWN    Flu Virus Vaccine OTHER (SEE COMMENTS)     Guillain [place] syndrome

## 2025-06-05 ENCOUNTER — OFFICE VISIT (OUTPATIENT)
Dept: GASTROENTEROLOGY | Facility: CLINIC | Age: 75
End: 2025-06-05
Payer: MEDICARE

## 2025-06-05 VITALS
BODY MASS INDEX: 30.18 KG/M2 | DIASTOLIC BLOOD PRESSURE: 70 MMHG | SYSTOLIC BLOOD PRESSURE: 120 MMHG | HEIGHT: 62 IN | WEIGHT: 164 LBS

## 2025-06-05 DIAGNOSIS — Z12.11 SCREENING FOR COLORECTAL CANCER: Primary | ICD-10-CM

## 2025-06-05 DIAGNOSIS — Z87.19 HISTORY OF DIVERTICULITIS OF COLON: ICD-10-CM

## 2025-06-05 DIAGNOSIS — R19.4 ALTERED BOWEL HABITS: ICD-10-CM

## 2025-06-05 DIAGNOSIS — Z12.12 SCREENING FOR COLORECTAL CANCER: Primary | ICD-10-CM

## 2025-06-05 DIAGNOSIS — Z98.890 HISTORY OF COLOSTOMY REVERSAL: ICD-10-CM

## 2025-06-05 PROCEDURE — 99204 OFFICE O/P NEW MOD 45 MIN: CPT | Performed by: INTERNAL MEDICINE

## 2025-07-04 RX ORDER — LOVASTATIN 40 MG/1
40 TABLET ORAL NIGHTLY
Qty: 90 TABLET | Refills: 0 | Status: SHIPPED | OUTPATIENT
Start: 2025-07-04

## 2025-07-04 NOTE — TELEPHONE ENCOUNTER
[employer] message with recommendation sent to pt, due for labs.     Refill Passed Per Protocol    Requested Prescriptions   Pending Prescriptions Disp Refills    LOVASTATIN 40 MG Oral Tab [Pharmacy Med Name: LOVASTATIN 40MG TABLETS] 90 tablet 0     Sig: TAKE 1 TABLET(40 MG) BY MOUTH EVERY NIGHT       Cholesterol Medication Protocol Passed - 7/4/2025 11:44 AM        Passed - ALT < 80     Lab Results   Component Value Date    ALT 19 04/01/2025             Passed - ALT resulted within past year        Passed - Lipid panel within past 12 months     Lab Results   Component Value Date    CHOLEST 201 (H) 04/01/2025    TRIG 226 (H) 04/01/2025    HDL 61 (H) 04/01/2025     (H) 04/01/2025    VLDL 38 (H) 04/01/2025    NONHDLC 140 (H) 04/01/2025             Passed - In person appointment or virtual visit in the past 12 mos or appointment in next 3 mos     Recent Outpatient Visits              4 weeks ago Screening for colorectal cancer    [facility], Thony Muñoz MD    Office Visit    4 months ago Medicare annual wellness visit, subsequent    [facility] Travis Javed MD    Office Visit    7 months ago     [place]  [facility]    [facility]    7 months ago Left lumbar radiculopathy    [place]  [facility] [place] [facility] Ba[place], PT    Office Visit    7 months ago     [place]  [facility] Abigail, PT    Office Visit                      Passed - Medication is active on med list               Recent Outpatient Visits              4 weeks [facility]    [facility]    4 months ago Medicare annual wellness visit, subsequent    [facility] Travis Javed MD    Office Visit    7 months ago     [place]  [facility]  Abig[place], PT    Office Visit    7 months ago Left lumbar radiculopathy    [place]  [facility] [place]    Office Visit    7 months ago     [place]  [facility] [place], PT    Office Visit